# Patient Record
Sex: FEMALE | Race: WHITE | NOT HISPANIC OR LATINO | Employment: FULL TIME | URBAN - NONMETROPOLITAN AREA
[De-identification: names, ages, dates, MRNs, and addresses within clinical notes are randomized per-mention and may not be internally consistent; named-entity substitution may affect disease eponyms.]

---

## 2019-05-09 ENCOUNTER — OFFICE VISIT (OUTPATIENT)
Dept: OBSTETRICS AND GYNECOLOGY | Facility: CLINIC | Age: 49
End: 2019-05-09

## 2019-05-09 VITALS
BODY MASS INDEX: 26.12 KG/M2 | DIASTOLIC BLOOD PRESSURE: 76 MMHG | WEIGHT: 153 LBS | HEIGHT: 64 IN | SYSTOLIC BLOOD PRESSURE: 124 MMHG

## 2019-05-09 DIAGNOSIS — F17.200 SMOKER: ICD-10-CM

## 2019-05-09 DIAGNOSIS — Z01.419 WELL WOMAN EXAM WITH ROUTINE GYNECOLOGICAL EXAM: Primary | ICD-10-CM

## 2019-05-09 DIAGNOSIS — N95.1 MENOPAUSAL SYMPTOMS: ICD-10-CM

## 2019-05-09 DIAGNOSIS — Z12.31 ENCOUNTER FOR SCREENING MAMMOGRAM FOR MALIGNANT NEOPLASM OF BREAST: ICD-10-CM

## 2019-05-09 PROCEDURE — G0123 SCREEN CERV/VAG THIN LAYER: HCPCS | Performed by: NURSE PRACTITIONER

## 2019-05-09 PROCEDURE — 99386 PREV VISIT NEW AGE 40-64: CPT | Performed by: NURSE PRACTITIONER

## 2019-05-09 PROCEDURE — 87624 HPV HI-RISK TYP POOLED RSLT: CPT | Performed by: NURSE PRACTITIONER

## 2019-05-09 RX ORDER — LEVOTHYROXINE SODIUM 0.1 MG/1
100 TABLET ORAL DAILY
Refills: 2 | COMMUNITY
Start: 2019-04-30

## 2019-05-09 NOTE — PROGRESS NOTES
"      Janet Marte is a 49 y.o.      Chief Complaint   Patient presents with   • Transitional Care Management     New pt. No referral. She is here for her yearly check up. She would like to discuss her menopausal symptoms.           HPI - Patient is in for annual exam.  She had a TL, she is a smoker.  She is having menopausal symptoms.  Her LMP was 2019 and she had regular menses until then.  She had hot flashes and night sweats but they are less not and tolerable.  She has no vaginal dryness.      The following portions of the patient's history were reviewed and updated as appropriate:vital signs, allergies, current medications, past family history, past medical history, past social history, past surgical history and problem list.      Current Outpatient Medications:   •  levothyroxine (SYNTHROID, LEVOTHROID) 100 MCG tablet, Take 100 mcg by mouth Daily., Disp: , Rfl: 2    Review of Systems   Constitutional: Negative for activity change and unexpected weight loss.   HENT: Negative for congestion.    Eyes: Negative for blurred vision, double vision and redness.   Respiratory: Negative for apnea, cough and chest tightness.    Cardiovascular: Negative for chest pain.   Gastrointestinal: Positive for constipation. Negative for blood in stool and diarrhea.   Endocrine: Negative for cold intolerance and heat intolerance.        Hypothyroidism  Some hot flashes   Genitourinary: Negative for dyspareunia, frequency, pelvic pain, vaginal discharge, breast lump and breast pain.   Musculoskeletal: Negative for arthralgias, back pain, neck pain and neck stiffness.   Skin: Negative for rash.   Neurological: Negative for dizziness, seizures and headache.   Psychiatric/Behavioral: Negative for agitation, behavioral problems, sleep disturbance and depressed mood. The patient is not nervous/anxious.      Breast ROS: negative    Objective      /76   Ht 162.6 cm (64\")   Wt 69.4 kg (153 lb)   LMP 2019 (Exact " Date)   Breastfeeding? No   BMI 26.26 kg/m²       Physical Exam   Constitutional: She is oriented to person, place, and time. She appears well-developed and well-nourished. No distress.   HENT:   Head: Normocephalic and atraumatic.   Eyes: Right eye exhibits no discharge. Left eye exhibits no discharge.   Neck: Normal range of motion. Neck supple.   Cardiovascular: Normal rate and regular rhythm.   No murmur heard.  Pulmonary/Chest: Effort normal and breath sounds normal. Right breast exhibits no inverted nipple, no mass and no nipple discharge. Left breast exhibits no inverted nipple, no mass and no nipple discharge.   Mildly fibrocystic   Abdominal: Soft. She exhibits no distension. There is no tenderness.   Genitourinary: Vagina normal and uterus normal. Pelvic exam was performed with patient supine. There is no rash, tenderness or lesion on the right labia. There is no rash, tenderness or lesion on the left labia. Cervix does not exhibit motion tenderness, discharge or friability. Right adnexum displays no tenderness and no fullness. Left adnexum displays fullness. Left adnexum displays no tenderness. No tenderness or bleeding in the vagina. No foreign body in the vagina. No vaginal discharge found.   Genitourinary Comments: Vagina is estrogenic  Fulness in the left adnexa, above ovary and patient is constipated       US offered due to findings and shedeclined as she states she is constipated   Musculoskeletal: Normal range of motion. She exhibits no edema.   Neurological: She is alert and oriented to person, place, and time.   Skin: Skin is warm and dry.   Psychiatric: She has a normal mood and affect. Her behavior is normal. Judgment normal.   Nursing note and vitals reviewed.       Assessment/Plan     Diagnoses and all orders for this visit:    1. Well woman exam with routine gynecological exam (Primary)  -     Liquid-based Pap Smear, Screening    2. Menopausal symptoms  Hot flashes tolerable.  May consider  Estroven.  Counseled re: perimenopause and menopause and bleeding patterns to report.    3. Encounter for screening mammogram for malignant neoplasm of breast  -     Mammo Screening Digital Tomosynthesis Bilateral With CAD; Future    4. BMI 26.0-26.9,adult  The patient is asked to make an attempt to improve diet and exercise patterns to aid in medical management of this problem.    5. Smoker  Counseled re: health risks associated with smoking, she is not ready to quit,    Patient is counseled re: BSE, diet, exercise, mammogram, calcium and Vit. D3              Chen You, APRN  5/9/2019

## 2019-05-13 LAB
GEN CATEG CVX/VAG CYTO-IMP: NORMAL
HPV I/H RISK 4 DNA CVX QL PROBE+SIG AMP: NOT DETECTED
LAB AP CASE REPORT: NORMAL
LAB AP GYN ADDITIONAL INFORMATION: NORMAL
LAB AP GYN OTHER FINDINGS: NORMAL
PATH INTERP SPEC-IMP: NORMAL
STAT OF ADQ CVX/VAG CYTO-IMP: NORMAL

## 2020-06-24 ENCOUNTER — TELEPHONE (OUTPATIENT)
Dept: GASTROENTEROLOGY | Facility: CLINIC | Age: 50
End: 2020-06-24

## 2020-06-24 NOTE — TELEPHONE ENCOUNTER
"I have sent 2 letters to pt re: recall colon and have had no response. I called and spoke to her about it today-she is agreeable to having a procedure but when I explained about the Covid testing she changed her mind. She doesn't \"want a swab up in to her brain\". She asked that I change her recall to 12/2020 and we can regroup at that time. She is not having any problems, so I changed her in the system to 12/2020. Pt advised to call me back with any further questions/problems or if she is ready to get on the schedule.   "